# Patient Record
Sex: MALE | URBAN - METROPOLITAN AREA
[De-identification: names, ages, dates, MRNs, and addresses within clinical notes are randomized per-mention and may not be internally consistent; named-entity substitution may affect disease eponyms.]

---

## 2019-03-01 ENCOUNTER — APPOINTMENT (OUTPATIENT)
Dept: URBAN - METROPOLITAN AREA CLINIC 211 | Age: 24
Setting detail: DERMATOLOGY
End: 2019-03-04

## 2019-03-01 DIAGNOSIS — L30.8 OTHER SPECIFIED DERMATITIS: ICD-10-CM

## 2019-03-01 PROBLEM — K21.9 GASTRO-ESOPHAGEAL REFLUX DISEASE WITHOUT ESOPHAGITIS: Status: ACTIVE | Noted: 2019-03-01

## 2019-03-01 PROCEDURE — OTHER MIPS QUALITY: OTHER

## 2019-03-01 PROCEDURE — OTHER TREATMENT REGIMEN: OTHER

## 2019-03-01 PROCEDURE — 99202 OFFICE O/P NEW SF 15 MIN: CPT

## 2019-03-01 PROCEDURE — OTHER COUNSELING: TOPICAL STEROIDS: OTHER

## 2019-03-01 PROCEDURE — OTHER PRESCRIPTION: OTHER

## 2019-03-01 RX ORDER — UREA 410 MG/G
CREAM TOPICAL
Qty: 1 | Refills: 5 | Status: ERX | COMMUNITY
Start: 2019-03-01

## 2019-03-01 RX ORDER — UREA 410 MG/G
CREAM TOPICAL
Qty: 1 | Refills: 5 | Status: ERX

## 2019-03-01 ASSESSMENT — SEVERITY ASSESSMENT: SEVERITY: ALMOST CLEAR

## 2019-03-01 ASSESSMENT — LOCATION SIMPLE DESCRIPTION DERM
LOCATION SIMPLE: LEFT MIDDLE FINGER
LOCATION SIMPLE: RIGHT MIDDLE FINGER

## 2019-03-01 ASSESSMENT — PAIN INTENSITY VAS: HOW INTENSE IS YOUR PAIN 0 BEING NO PAIN, 10 BEING THE MOST SEVERE PAIN POSSIBLE?: NO PAIN

## 2019-03-01 ASSESSMENT — LOCATION DETAILED DESCRIPTION DERM
LOCATION DETAILED: RIGHT MIDDLE FINGERTIP
LOCATION DETAILED: LEFT MIDDLE FINGERTIP

## 2019-03-01 ASSESSMENT — LOCATION ZONE DERM: LOCATION ZONE: FINGER

## 2019-03-01 NOTE — PROCEDURE: TREATMENT REGIMEN
Discontinue Regimen: Fluocinolone at this time, if skin is open and cracking ok to use prn
Plan: Advised result of constant friction with keyboard. Use bandaids or cover finger. \\nBegin Utopic QHS to help with peeling.\\nBegin steroids PRN flares if needed. \\nReassured, RTC PRN
Detail Level: Zone

## 2019-03-01 NOTE — PROCEDURE: MIPS QUALITY
Detail Level: Detailed
Quality 110: Preventive Care And Screening: Influenza Immunization: Influenza immunization was not ordered or administered, reason not given
Quality 226: Preventive Care And Screening: Tobacco Use: Screening And Cessation Intervention: Patient screened for tobacco use and is an ex/non-smoker
Quality 130: Documentation Of Current Medications In The Medical Record: Current Medications Documented
Quality 131: Pain Assessment And Follow-Up: Pain assessment using a standardized tool is documented as negative, no follow-up plan required
Quality 431: Preventive Care And Screening: Unhealthy Alcohol Use - Screening: Patient screened for unhealthy alcohol use using a single question and scores less than 2 times per year

## 2019-03-01 NOTE — HPI: RASH
What Type Of Note Output Would You Prefer (Optional)?: Bullet Format
How Severe Is Your Rash?: mild
Is This A New Presentation, Or A Follow-Up?: Rash
Additional History: Patient states he uses Fluocinonide and moisturizer to AA and sees some improvement. Patient states he additionally gets dry between his toes sometimes. \\n\\nPain 0/10